# Patient Record
Sex: FEMALE | Race: WHITE | NOT HISPANIC OR LATINO | Employment: OTHER | URBAN - METROPOLITAN AREA
[De-identification: names, ages, dates, MRNs, and addresses within clinical notes are randomized per-mention and may not be internally consistent; named-entity substitution may affect disease eponyms.]

---

## 2018-05-02 ENCOUNTER — OFFICE VISIT (OUTPATIENT)
Dept: UROLOGY | Facility: CLINIC | Age: 58
End: 2018-05-02
Payer: COMMERCIAL

## 2018-05-02 VITALS
WEIGHT: 129 LBS | DIASTOLIC BLOOD PRESSURE: 82 MMHG | BODY MASS INDEX: 23.74 KG/M2 | SYSTOLIC BLOOD PRESSURE: 133 MMHG | HEIGHT: 62 IN

## 2018-05-02 DIAGNOSIS — N20.0 CALCULUS OF KIDNEY: Primary | ICD-10-CM

## 2018-05-02 DIAGNOSIS — N39.0 URINARY TRACT INFECTION WITHOUT HEMATURIA, SITE UNSPECIFIED: ICD-10-CM

## 2018-05-02 DIAGNOSIS — N20.1 CALCULUS OF DISTAL LEFT URETER: ICD-10-CM

## 2018-05-02 LAB
SL AMB  POCT GLUCOSE, UA: NORMAL
SL AMB LEUKOCYTE ESTERASE,UA: NORMAL
SL AMB POCT BLOOD,UA: NORMAL
SL AMB POCT CLARITY,UA: CLEAR
SL AMB POCT COLOR,UA: YELLOW
SL AMB POCT KETONES,UA: NORMAL
SL AMB POCT NITRITE,UA: NORMAL
SL AMB POCT PH,UA: 5
SL AMB POCT URINE PROTEIN: NORMAL

## 2018-05-02 PROCEDURE — 81002 URINALYSIS NONAUTO W/O SCOPE: CPT | Performed by: PHYSICIAN ASSISTANT

## 2018-05-02 PROCEDURE — 99213 OFFICE O/P EST LOW 20 MIN: CPT | Performed by: PHYSICIAN ASSISTANT

## 2018-05-02 RX ORDER — MULTIVIT-MIN/IRON FUM/FOLIC AC 7.5 MG-4
1 TABLET ORAL DAILY
COMMUNITY

## 2018-05-02 RX ORDER — LEVOTHYROXINE SODIUM 0.03 MG/1
25 TABLET ORAL DAILY
Refills: 1 | COMMUNITY
Start: 2018-04-12

## 2018-05-02 RX ORDER — METOPROLOL SUCCINATE 25 MG/1
50 TABLET, EXTENDED RELEASE ORAL
Refills: 0 | COMMUNITY
Start: 2018-02-05

## 2018-05-02 RX ORDER — FERROUS SULFATE 325(65) MG
TABLET ORAL
COMMUNITY
Start: 2010-09-16

## 2018-05-02 NOTE — PROGRESS NOTES
UROLOGY PROGRESS NOTE   Patient Identifiers: Darrin Saul (MRN [id number])  Date of Service: 5/2/2018    Subjective:    61year old female with history of kidney stones and she has a colostomy for Crohns disease  She was recently treated for a suspected infection by urgent care with 3 days of bactrim  He comes in complaining of left flank pain  Urine is clear  No fever or chills  Patient has  see above      Objective:     VITALS:    There were no vitals filed for this visit  LABS:  Lab Results   Component Value Date    HGB 9 0 (L) 02/11/2014    HCT 27 7 (L) 02/11/2014    WBC 14 54 (H) 02/11/2014     02/11/2014   ]    Lab Results   Component Value Date     02/11/2014    K 3 1 (L) 02/11/2014     02/11/2014    CO2 29 02/11/2014    BUN 10 02/11/2014    CREATININE 0 69 02/11/2014    CALCIUM 7 4 (L) 02/11/2014    GLUCOSE 99 02/11/2014   ]    INPATIENT MEDS:    Current Outpatient Prescriptions:     Cholecalciferol (VITAMIN D3) 93439 units TABS, Take by mouth, Disp: , Rfl:     ferrous sulfate 325 (65 Fe) mg tablet, Take by mouth, Disp: , Rfl:     levothyroxine 25 mcg tablet, Take 25 mcg by mouth daily, Disp: , Rfl: 1    metoprolol succinate (TOPROL-XL) 25 mg 24 hr tablet, Take 1/2 Tab  Daily, Disp: , Rfl: 0    Multiple Vitamins-Minerals (MULTIVITAMIN WITH MINERALS) tablet, Take 1 tablet by mouth daily, Disp: , Rfl:       Physical Exam:   There were no vitals taken for this visit  GEN: no acute distress    RESP: breathing comfortably with no accessory muscle use    ABD: slightly tender left lower quadrant, colostmy on the right   INCISION:    EXT: no significant peripheral edema       RADIOLOGY:   none     Assessment:   1  Left lower quadrant pain  2   Kidney stones     Plan:   - will get a CT stone study to evaluate for a ureteral stone or hydronephrosis  -  -  -

## 2018-05-04 ENCOUNTER — HOSPITAL ENCOUNTER (OUTPATIENT)
Dept: RADIOLOGY | Age: 58
Discharge: HOME | End: 2018-05-04
Payer: COMMERCIAL

## 2018-05-04 DIAGNOSIS — N20.1 CALCULUS OF DISTAL LEFT URETER: ICD-10-CM

## 2018-05-04 PROCEDURE — 74176 CT ABD & PELVIS W/O CONTRAST: CPT

## 2018-05-10 ENCOUNTER — TELEPHONE (OUTPATIENT)
Dept: UROLOGY | Facility: CLINIC | Age: 58
End: 2018-05-10

## 2018-05-11 DIAGNOSIS — N13.30 HYDRONEPHROSIS, UNSPECIFIED HYDRONEPHROSIS TYPE: Primary | ICD-10-CM

## 2018-05-16 NOTE — TELEPHONE ENCOUNTER
Spoke patient on the phone after reviewing the imaging studies    She is scheduled for a nuclear study but after review of the studies I do believe that this is a parapelvic cyst   I believe that a retrograde study will be the most distinct way to demonstrate whether it is a UPJ or a cyst patient is in agreement to do the study

## 2018-05-17 ENCOUNTER — CLINICAL SUPPORT (OUTPATIENT)
Dept: UROLOGY | Facility: CLINIC | Age: 58
End: 2018-05-17
Payer: COMMERCIAL

## 2018-05-17 ENCOUNTER — PREP FOR PROCEDURE (OUTPATIENT)
Dept: UROLOGY | Facility: CLINIC | Age: 58
End: 2018-05-17

## 2018-05-17 DIAGNOSIS — R93.89 ABNORMAL FINDING ON RADIOLOGY EXAM: Primary | ICD-10-CM

## 2018-05-17 DIAGNOSIS — R31.9 URINARY TRACT INFECTION WITH HEMATURIA, SITE UNSPECIFIED: Primary | ICD-10-CM

## 2018-05-17 DIAGNOSIS — N39.0 URINARY TRACT INFECTION WITH HEMATURIA, SITE UNSPECIFIED: Primary | ICD-10-CM

## 2018-05-17 DIAGNOSIS — Z01.818 PREOP EXAMINATION: ICD-10-CM

## 2018-05-17 LAB
SL AMB  POCT GLUCOSE, UA: ABNORMAL
SL AMB LEUKOCYTE ESTERASE,UA: ABNORMAL
SL AMB POCT BLOOD,UA: ABNORMAL
SL AMB POCT CLARITY,UA: ABNORMAL
SL AMB POCT COLOR,UA: YELLOW
SL AMB POCT KETONES,UA: ABNORMAL
SL AMB POCT NITRITE,UA: ABNORMAL
SL AMB POCT PH,UA: 5.5
SL AMB POCT URINE PROTEIN: ABNORMAL

## 2018-05-17 PROCEDURE — 87186 SC STD MICRODIL/AGAR DIL: CPT | Performed by: PHYSICIAN ASSISTANT

## 2018-05-17 PROCEDURE — 87086 URINE CULTURE/COLONY COUNT: CPT | Performed by: PHYSICIAN ASSISTANT

## 2018-05-17 PROCEDURE — 99211 OFF/OP EST MAY X REQ PHY/QHP: CPT

## 2018-05-17 PROCEDURE — 87077 CULTURE AEROBIC IDENTIFY: CPT | Performed by: PHYSICIAN ASSISTANT

## 2018-05-17 PROCEDURE — 81002 URINALYSIS NONAUTO W/O SCOPE: CPT

## 2018-05-17 PROCEDURE — P9612 CATHETERIZE FOR URINE SPEC: HCPCS

## 2018-05-17 RX ORDER — SULFAMETHOXAZOLE AND TRIMETHOPRIM 800; 160 MG/1; MG/1
1 TABLET ORAL EVERY 12 HOURS SCHEDULED
Qty: 14 TABLET | Refills: 0 | Status: SHIPPED | OUTPATIENT
Start: 2018-05-17 | End: 2018-05-24

## 2018-05-17 NOTE — PROGRESS NOTES
Chief Complaint     Urinary Tract Infection        Patient presents today c/o urinary frequency and pressure  She denies burning and hematuria  Catheter specimen was obtained and sent for culture  I spoke with Anna Melendez and he is sending in an antibiotic for patient to start  Awaiting culture results

## 2018-05-19 LAB — BACTERIA UR CULT: ABNORMAL

## 2018-05-22 NOTE — PRE-PROCEDURE INSTRUCTIONS
Pre-Surgery Instructions:   Medication Instructions    Cholecalciferol (VITAMIN D3) 40316 units TABS Instructed patient per Anesthesia Guidelines   ferrous sulfate 325 (65 Fe) mg tablet Instructed patient per Anesthesia Guidelines   levothyroxine 25 mcg tablet Instructed patient per Anesthesia Guidelines   metoprolol succinate (TOPROL-XL) 25 mg 24 hr tablet Instructed patient per Anesthesia Guidelines   Multiple Vitamins-Minerals (MULTIVITAMIN WITH MINERALS) tablet Instructed patient per Anesthesia Guidelines   sulfamethoxazole-trimethoprim (BACTRIM DS) 800-160 mg per tablet Instructed patient per Anesthesia Guidelines  Spoke to pt  Medication list reviewed & instructed  As of 5 22 18 pt to stop MV, vit D, iron  Instructed on tylenol only  Pt taking bactrim, last dose Wednesday pm    Am DOS pt to take levothyroxine, metoprolol  Showering instructions given at time of call  All instructions verbally understood by patient  No further questions  Beta blocker Med Class     Continue to take this heart medication on your normal schedule  If this is an oral medication and you take it in the morning, then you may take this medicine with a sip of water  Thyroxine Med Class     Continue to take this medication on your normal schedule  If this is an oral medication and you take it in the morning, then you may take this medicine with a sip of water

## 2018-05-23 ENCOUNTER — ANESTHESIA EVENT (OUTPATIENT)
Dept: PERIOP | Facility: HOSPITAL | Age: 58
End: 2018-05-23
Payer: COMMERCIAL

## 2018-05-23 NOTE — ANESTHESIA PREPROCEDURE EVALUATION
Review of Systems/Medical History  Patient summary reviewed    History of anesthetic complications PONV    Cardiovascular   Pulmonary       GI/Hepatic      Comment: Crohn's     Kidney stones,        Endo/Other     GYN       Hematology   Musculoskeletal       Neurology   Psychology           Physical Exam    Airway    Mallampati score: II  TM Distance: >3 FB  Neck ROM: full     Dental       Cardiovascular      Pulmonary      Other Findings        Anesthesia Plan  ASA Score- 2     Anesthesia Type- general with ASA Monitors  Additional Monitors:   Airway Plan: LMA  Plan Factors-    Induction- intravenous  Postoperative Plan-     Informed Consent- Anesthetic plan and risks discussed with patient

## 2018-05-24 ENCOUNTER — ANESTHESIA (OUTPATIENT)
Dept: PERIOP | Facility: HOSPITAL | Age: 58
End: 2018-05-24
Payer: COMMERCIAL

## 2018-05-24 ENCOUNTER — APPOINTMENT (OUTPATIENT)
Dept: RADIOLOGY | Facility: HOSPITAL | Age: 58
End: 2018-05-24
Payer: COMMERCIAL

## 2018-05-24 ENCOUNTER — HOSPITAL ENCOUNTER (OUTPATIENT)
Facility: HOSPITAL | Age: 58
Setting detail: OUTPATIENT SURGERY
Discharge: HOME | End: 2018-05-24
Attending: UROLOGY | Admitting: UROLOGY
Payer: COMMERCIAL

## 2018-05-24 VITALS
WEIGHT: 129 LBS | SYSTOLIC BLOOD PRESSURE: 135 MMHG | BODY MASS INDEX: 23.74 KG/M2 | DIASTOLIC BLOOD PRESSURE: 82 MMHG | RESPIRATION RATE: 18 BRPM | HEIGHT: 62 IN | HEART RATE: 79 BPM | OXYGEN SATURATION: 99 % | TEMPERATURE: 98.1 F

## 2018-05-24 DIAGNOSIS — R93.89 ABNORMAL FINDING ON RADIOLOGY EXAM: ICD-10-CM

## 2018-05-24 PROCEDURE — 52005 CYSTO W/URTRL CATHJ: CPT | Performed by: UROLOGY

## 2018-05-24 PROCEDURE — C1769 GUIDE WIRE: HCPCS | Performed by: UROLOGY

## 2018-05-24 PROCEDURE — 74420 UROGRAPHY RTRGR +-KUB: CPT

## 2018-05-24 PROCEDURE — 87086 URINE CULTURE/COLONY COUNT: CPT | Performed by: UROLOGY

## 2018-05-24 RX ORDER — PROPOFOL 10 MG/ML
INJECTION, EMULSION INTRAVENOUS CONTINUOUS PRN
Status: DISCONTINUED | OUTPATIENT
Start: 2018-05-24 | End: 2018-05-24

## 2018-05-24 RX ORDER — SODIUM CHLORIDE, SODIUM LACTATE, POTASSIUM CHLORIDE, CALCIUM CHLORIDE 600; 310; 30; 20 MG/100ML; MG/100ML; MG/100ML; MG/100ML
125 INJECTION, SOLUTION INTRAVENOUS CONTINUOUS
Status: DISCONTINUED | OUTPATIENT
Start: 2018-05-24 | End: 2018-05-24 | Stop reason: HOSPADM

## 2018-05-24 RX ORDER — ONDANSETRON 2 MG/ML
4 INJECTION INTRAMUSCULAR; INTRAVENOUS ONCE AS NEEDED
Status: DISCONTINUED | OUTPATIENT
Start: 2018-05-24 | End: 2018-05-24 | Stop reason: HOSPADM

## 2018-05-24 RX ORDER — DIPHENHYDRAMINE HYDROCHLORIDE 50 MG/ML
12.5 INJECTION INTRAMUSCULAR; INTRAVENOUS ONCE AS NEEDED
Status: DISCONTINUED | OUTPATIENT
Start: 2018-05-24 | End: 2018-05-24 | Stop reason: HOSPADM

## 2018-05-24 RX ORDER — LIDOCAINE HYDROCHLORIDE 10 MG/ML
INJECTION, SOLUTION INFILTRATION; PERINEURAL AS NEEDED
Status: DISCONTINUED | OUTPATIENT
Start: 2018-05-24 | End: 2018-05-24 | Stop reason: SURG

## 2018-05-24 RX ORDER — FENTANYL CITRATE/PF 50 MCG/ML
25 SYRINGE (ML) INJECTION
Status: DISCONTINUED | OUTPATIENT
Start: 2018-05-24 | End: 2018-05-24 | Stop reason: HOSPADM

## 2018-05-24 RX ORDER — KETOROLAC TROMETHAMINE 30 MG/ML
INJECTION, SOLUTION INTRAMUSCULAR; INTRAVENOUS AS NEEDED
Status: DISCONTINUED | OUTPATIENT
Start: 2018-05-24 | End: 2018-05-24 | Stop reason: SURG

## 2018-05-24 RX ORDER — MAGNESIUM HYDROXIDE 1200 MG/15ML
LIQUID ORAL AS NEEDED
Status: DISCONTINUED | OUTPATIENT
Start: 2018-05-24 | End: 2018-05-24 | Stop reason: HOSPADM

## 2018-05-24 RX ORDER — MIDAZOLAM HYDROCHLORIDE 1 MG/ML
INJECTION INTRAMUSCULAR; INTRAVENOUS AS NEEDED
Status: DISCONTINUED | OUTPATIENT
Start: 2018-05-24 | End: 2018-05-24 | Stop reason: SURG

## 2018-05-24 RX ORDER — PROPOFOL 10 MG/ML
INJECTION, EMULSION INTRAVENOUS AS NEEDED
Status: DISCONTINUED | OUTPATIENT
Start: 2018-05-24 | End: 2018-05-24 | Stop reason: SURG

## 2018-05-24 RX ADMIN — KETOROLAC TROMETHAMINE 30 MG: 30 INJECTION, SOLUTION INTRAMUSCULAR at 08:35

## 2018-05-24 RX ADMIN — PROPOFOL 80 MG: 10 INJECTION, EMULSION INTRAVENOUS at 08:06

## 2018-05-24 RX ADMIN — MIDAZOLAM 2 MG: 1 INJECTION INTRAMUSCULAR; INTRAVENOUS at 08:04

## 2018-05-24 RX ADMIN — SODIUM CHLORIDE, SODIUM LACTATE, POTASSIUM CHLORIDE, AND CALCIUM CHLORIDE: .6; .31; .03; .02 INJECTION, SOLUTION INTRAVENOUS at 07:35

## 2018-05-24 RX ADMIN — LIDOCAINE HYDROCHLORIDE 30 MG: 10 INJECTION, SOLUTION INFILTRATION; PERINEURAL at 08:06

## 2018-05-24 RX ADMIN — Medication 2000 MG: at 08:08

## 2018-05-24 RX ADMIN — PROPOFOL 80 MCG/KG/MIN: 10 INJECTION, EMULSION INTRAVENOUS at 08:06

## 2018-05-24 NOTE — H&P (VIEW-ONLY)
HISTORY AND PHYSICAL     Patient Identifiers: Kishore Bonilla (MRN: [id number])    Urology, Julio Michaels PA-C  Date of Service: 5/21/2018    History of Present Illness:     Kishore Bonilla is a 60 y o    female with history of kidney stones and she has a colostomy for Crohns disease  She was recently treated for a suspected infection by urgent care with 3 days of bactrim  She  comes in complaining of left flank pain  Urine is clear  No fever or chills  Follow up CT shows moderate left hydronephrosis consistant with congenital UPJ  Past Medical, Past Surgical History:     Past Medical History:   Diagnosis Date    Crohn's disease ([facility] [address] )     Gallstones     Kidney stones     Osteoporosis    :    Past Surgical History:   Procedure Laterality Date    COLECTOMY      LITHOTRIPSY      TOTAL HIP ARTHROPLASTY Left    :    Medications, Allergies:     Current Outpatient Prescriptions:     Cholecalciferol (VITAMIN D3) 61439 units TABS, Take by mouth, Disp: , Rfl:     ferrous sulfate 325 (65 Fe) mg tablet, Take by mouth, Disp: , Rfl:     levothyroxine 25 mcg tablet, Take 25 mcg by mouth daily, Disp: , Rfl: 1    metoprolol succinate (TOPROL-XL) 25 mg 24 hr tablet, Take 1/2 Tab   Daily, Disp: , Rfl: 0    Multiple Vitamins-Minerals (MULTIVITAMIN WITH MINERALS) tablet, Take 1 tablet by mouth daily, Disp: , Rfl:     sulfamethoxazole-trimethoprim (BACTRIM DS) 800-160 mg per tablet, Take 1 tablet by mouth every 12 (twelve) hours for 7 days, Disp: 14 tablet, Rfl: 0    Allergies:  No Known Allergies:    Social and Family History:   Social History:   Social History   Substance Use Topics    Smoking status: Former Smoker    Smokeless tobacco: Never Used    Alcohol use No        History   Smoking Status    Former Smoker   Smokeless Tobacco    Never Used       Family History:  Family History   Problem Relation Age of Onset    Diabetes Mother     Heart disease Father     Stroke Brother     Stomach cancer Maternal Grandmother    :     Review of Systems:     General: Fever, chills, or night sweats: negative  Cardiac: Negative for chest pain  Pulmonary: Negative for shortness of breath  Gastrointestinal: Abdominal pain negative  Nausea, vomiting, or diarrhea negative,  Genitourinary: See HPI above  Patient does not have hematuria  All other systems queried were negative  Physical Exam:   General: Patient is pleasant and in NAD  Awake and alert  There were no vitals taken for this visit  Cardiac: regular rate  Peripheral edema: negative  Pulmonary: Non-labored breathing lungs clear bilateral  Abdomen: Soft, non-tender, non-distended  No surgical scars  No masses, tenderness, hernias noted  Genitourinary: Negative CVA tenderness, negative suprapubic tenderness  Labs:     Lab Results   Component Value Date    HGB 9 0 (L) 02/11/2014    HCT 27 7 (L) 02/11/2014    WBC 14 54 (H) 02/11/2014     02/11/2014   ]    Lab Results   Component Value Date     02/11/2014    K 3 1 (L) 02/11/2014     02/11/2014    CO2 29 02/11/2014    BUN 10 02/11/2014    CREATININE 0 69 02/11/2014    CALCIUM 7 4 (L) 02/11/2014    GLUCOSE 99 02/11/2014   ]    Imaging:   I personally reviewed the images and report of the following studies, and reviewed them with the patient:  CT ABDOMEN AND PELVIS WITHOUT IV CONTRAST - LOW DOSE RENAL STONE   IMPRESSION:     There is moderate left hydronephrosis, with a particularly severely dilated left renal pelvis  This appearance was also present on the 6/28/2011 retrograde pyelogram, therefore is probably congenital UPJ obstruction  There are no obstructing left   ureteral stones  There is a punctate left kidney midpole calyceal stone (series 2 image 71 )  There is another 6 mm left lower pole calyceal stone (series 2 image 82 )      Cholelithiasis without CT evidence of acute       ASSESSMENT:   1   Left hydronephrosis    PLAN:   Cysto bilateral retrograde pyelogram possible  NU stent insertion       Monae Dent PA-C

## 2018-05-24 NOTE — ANESTHESIA POSTPROCEDURE EVALUATION
Post-Op Assessment Note      CV Status:  Stable    Mental Status:  Alert and awake    Hydration Status:  Euvolemic    PONV Controlled:  Controlled    Airway Patency:  Patent    Post Op Vitals Reviewed: Yes          Staff: CRNA           BP   123/70   Temp      Pulse  86   Resp   18   SpO2   100%

## 2018-05-24 NOTE — OP NOTE
OPERATIVE REPORT  PATIENT NAME: Joanna Carcamo    :  1960  MRN: [id number]  Pt Location: [facility] ROOM 01    SURGERY DATE: 2018    Surgeon(s) and Role:     * Aleyda Huff MD - Primary    Preop Diagnosis:  Abnormal finding on radiology exam [R93 8]  Left hydronephrosis     Post-Op Diagnosis Codes:     * Abnormal finding on radiology exam [R93 8]  Left hydronephrosis    Procedure(s) (LRB):  CYSTOSCOPY WITH RETROGRADE PYELOGRAM (Bilateral)    Specimen(s):  ID Type Source Tests Collected by Time Destination   A :  Urine Urine, Cystoscopic URINE CULTURE Aleyda Huff MD 2018 [id number]        Estimated Blood Loss:   Minimal    Drains:       Anesthesia Type:   General    Operative Indications:  Abnormal finding on radiology exam [R93 8]  Same    Operative Findings:  Significant deviation of the left proximal ureter with resultant dilatation of the left renal unit  Complications:   None    Procedure and Technique: This patient has had intermittent left flank pain for which a CT scan was ordered  This did reveal a hydronephrotic left kidney  She is brought to the operating room identified and transferred to the OR table  Sedation anesthesia was then administered  Patient was placed in lithotomy position genitalia prepped with Betadine  Cath specimens obtained for culture  The bladder was then inspected with 30 degree and 70 degree lenses  There was no significant pathology noted in the bladder  There was no tumor stone  The ureteral orifices were unremarkable  Bilateral retrograde studies were then performed  The right renal unit and ureter were completely unremarkable  The left ureter had significant deviation laterally  The retrograde study showed a mild hydronephrosis on that left side  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

## 2018-05-24 NOTE — DISCHARGE INSTRUCTIONS

## 2018-05-26 LAB — BACTERIA UR CULT: NORMAL

## 2018-05-29 DIAGNOSIS — N13.30 HYDRONEPHROSIS DETERMINED BY ULTRASOUND: Primary | ICD-10-CM

## 2018-05-29 NOTE — PROGRESS NOTES
Patient has a mild left hydronephrosis  There is prominent deviation of the left proximal ureter  This is likely secondary to her previous surgery    Will check renal function nuclear study

## 2018-06-01 ENCOUNTER — TELEPHONE (OUTPATIENT)
Dept: UROLOGY | Facility: CLINIC | Age: 58
End: 2018-06-01

## 2018-06-01 NOTE — TELEPHONE ENCOUNTER
I CALLED PT TO TRY AND SCHEDULE HER FOR A MAG 3 SCAN  THERE WAS NO ANSWER WHEN I CALLED SO I DID L/M ASKING PT TO CALL OUR OFFICE BACK TO GET THIS SCHEDULED

## 2018-06-04 ENCOUNTER — CLINICAL SUPPORT (OUTPATIENT)
Dept: UROLOGY | Facility: CLINIC | Age: 58
End: 2018-06-04
Payer: COMMERCIAL

## 2018-06-04 VITALS — HEIGHT: 62 IN | BODY MASS INDEX: 23.74 KG/M2 | WEIGHT: 129 LBS

## 2018-06-04 DIAGNOSIS — N39.0 URINARY TRACT INFECTION WITHOUT HEMATURIA, SITE UNSPECIFIED: Primary | ICD-10-CM

## 2018-06-04 LAB
SL AMB  POCT GLUCOSE, UA: ABNORMAL
SL AMB LEUKOCYTE ESTERASE,UA: ABNORMAL
SL AMB POCT BILIRUBIN,UA: ABNORMAL
SL AMB POCT BLOOD,UA: ABNORMAL
SL AMB POCT CLARITY,UA: CLEAR
SL AMB POCT COLOR,UA: YELLOW
SL AMB POCT KETONES,UA: ABNORMAL
SL AMB POCT NITRITE,UA: ABNORMAL
SL AMB POCT PH,UA: 5
SL AMB POCT SPECIFIC GRAVITY,UA: ABNORMAL
SL AMB POCT URINE PROTEIN: ABNORMAL
SL AMB POCT UROBILINOGEN: ABNORMAL

## 2018-06-04 PROCEDURE — 87086 URINE CULTURE/COLONY COUNT: CPT | Performed by: PHYSICIAN ASSISTANT

## 2018-06-04 PROCEDURE — 81002 URINALYSIS NONAUTO W/O SCOPE: CPT

## 2018-06-04 PROCEDURE — 87186 SC STD MICRODIL/AGAR DIL: CPT | Performed by: PHYSICIAN ASSISTANT

## 2018-06-04 PROCEDURE — 87077 CULTURE AEROBIC IDENTIFY: CPT | Performed by: PHYSICIAN ASSISTANT

## 2018-06-04 PROCEDURE — P9612 CATHETERIZE FOR URINE SPEC: HCPCS

## 2018-06-04 PROCEDURE — 99211 OFF/OP EST MAY X REQ PHY/QHP: CPT

## 2018-06-04 RX ORDER — NITROFURANTOIN MACROCRYSTALS 50 MG/1
CAPSULE ORAL
Qty: 30 CAPSULE | Refills: 3 | Status: SHIPPED | OUTPATIENT
Start: 2018-06-04 | End: 2021-05-06

## 2018-06-04 NOTE — PROGRESS NOTES
Patient complains of discomfort and burning with urination, specifically after having sexual relations  As per JOON Santamaria, we will send out a culture  He will give her an antibiotic to take after having sexual relations

## 2018-06-06 LAB — BACTERIA UR CULT: ABNORMAL

## 2018-08-07 ENCOUNTER — TELEPHONE (OUTPATIENT)
Dept: UROLOGY | Facility: CLINIC | Age: 58
End: 2018-08-07

## 2018-08-07 NOTE — TELEPHONE ENCOUNTER
I called pt to see if she was interested in r/s her appt from 07/13/18 that she had canceled  I did speak with pt and she said that at this time she is doing really well and is not having any problems  Pt said that she will call us if she needs us

## 2019-04-08 ENCOUNTER — TELEPHONE (OUTPATIENT)
Dept: UROLOGY | Facility: AMBULATORY SURGERY CENTER | Age: 59
End: 2019-04-08

## 2019-04-08 DIAGNOSIS — N20.0 KIDNEY STONES: Primary | ICD-10-CM

## 2019-04-12 ENCOUNTER — HOSPITAL ENCOUNTER (OUTPATIENT)
Dept: RADIOLOGY | Age: 59
Discharge: HOME | End: 2019-04-12
Payer: COMMERCIAL

## 2019-04-12 DIAGNOSIS — N20.0 KIDNEY STONES: ICD-10-CM

## 2019-04-12 PROCEDURE — 76770 US EXAM ABDO BACK WALL COMP: CPT

## 2019-04-16 ENCOUNTER — OFFICE VISIT (OUTPATIENT)
Dept: UROLOGY | Facility: CLINIC | Age: 59
End: 2019-04-16
Payer: COMMERCIAL

## 2019-04-16 VITALS
DIASTOLIC BLOOD PRESSURE: 84 MMHG | SYSTOLIC BLOOD PRESSURE: 122 MMHG | BODY MASS INDEX: 24.48 KG/M2 | HEIGHT: 62 IN | WEIGHT: 133 LBS

## 2019-04-16 DIAGNOSIS — N28.1 RENAL CYST: Primary | ICD-10-CM

## 2019-04-16 PROCEDURE — 99213 OFFICE O/P EST LOW 20 MIN: CPT | Performed by: PHYSICIAN ASSISTANT

## 2019-04-16 RX ORDER — CHOLECALCIFEROL (VITAMIN D3) 125 MCG
1000 CAPSULE ORAL
COMMUNITY

## 2019-04-16 RX ORDER — AMOXICILLIN 500 MG/1
CAPSULE ORAL
Refills: 0 | COMMUNITY
Start: 2019-02-11

## 2019-05-07 ENCOUNTER — TELEPHONE (OUTPATIENT)
Dept: UROLOGY | Facility: CLINIC | Age: 59
End: 2019-05-07

## 2019-06-03 PROBLEM — N28.1 RENAL CYST: Status: ACTIVE | Noted: 2019-06-03

## 2019-06-03 PROBLEM — R10.9 FLANK PAIN: Status: ACTIVE | Noted: 2019-06-03

## 2019-06-03 PROBLEM — N13.39 OTHER HYDRONEPHROSIS: Status: ACTIVE | Noted: 2019-06-03

## 2019-06-04 ENCOUNTER — OFFICE VISIT (OUTPATIENT)
Dept: UROLOGY | Facility: CLINIC | Age: 59
End: 2019-06-04
Payer: COMMERCIAL

## 2019-06-04 VITALS
HEIGHT: 62 IN | SYSTOLIC BLOOD PRESSURE: 120 MMHG | BODY MASS INDEX: 24.48 KG/M2 | DIASTOLIC BLOOD PRESSURE: 88 MMHG | HEART RATE: 74 BPM | WEIGHT: 133 LBS

## 2019-06-04 DIAGNOSIS — R93.89 ABNORMAL FINDING ON RADIOLOGY EXAM: Primary | ICD-10-CM

## 2019-06-04 DIAGNOSIS — N13.39 OTHER HYDRONEPHROSIS: ICD-10-CM

## 2019-06-04 DIAGNOSIS — R10.9 FLANK PAIN: ICD-10-CM

## 2019-06-04 DIAGNOSIS — N28.1 RENAL CYST: ICD-10-CM

## 2019-06-04 PROCEDURE — 99214 OFFICE O/P EST MOD 30 MIN: CPT | Performed by: UROLOGY

## 2019-06-20 RX ORDER — SODIUM CHLORIDE 9 MG/ML
75 INJECTION, SOLUTION INTRAVENOUS CONTINUOUS
Status: CANCELLED | OUTPATIENT
Start: 2019-06-27

## 2019-06-21 ENCOUNTER — TELEPHONE (OUTPATIENT)
Dept: RADIOLOGY | Facility: HOSPITAL | Age: 59
End: 2019-06-21

## 2019-06-27 ENCOUNTER — HOSPITAL ENCOUNTER (OUTPATIENT)
Dept: RADIOLOGY | Facility: HOSPITAL | Age: 59
Discharge: HOME | End: 2019-06-27
Attending: UROLOGY | Admitting: UROLOGY
Payer: COMMERCIAL

## 2019-06-27 VITALS
HEART RATE: 72 BPM | TEMPERATURE: 97.4 F | SYSTOLIC BLOOD PRESSURE: 130 MMHG | OXYGEN SATURATION: 96 % | BODY MASS INDEX: 23.92 KG/M2 | RESPIRATION RATE: 18 BRPM | WEIGHT: 130 LBS | DIASTOLIC BLOOD PRESSURE: 63 MMHG | HEIGHT: 62 IN

## 2019-06-27 DIAGNOSIS — N28.1 RENAL CYST: ICD-10-CM

## 2019-06-27 DIAGNOSIS — R10.9 FLANK PAIN: ICD-10-CM

## 2019-06-27 PROCEDURE — 99152 MOD SED SAME PHYS/QHP 5/>YRS: CPT | Performed by: RADIOLOGY

## 2019-06-27 PROCEDURE — 76942 ECHO GUIDE FOR BIOPSY: CPT | Performed by: RADIOLOGY

## 2019-06-27 PROCEDURE — 99152 MOD SED SAME PHYS/QHP 5/>YRS: CPT

## 2019-06-27 PROCEDURE — 50390 DRAINAGE OF KIDNEY LESION: CPT | Performed by: RADIOLOGY

## 2019-06-27 PROCEDURE — 10030 IMG GID FLU COLL DRG SFT TIS: CPT

## 2019-06-27 RX ORDER — SODIUM CHLORIDE 9 MG/ML
75 INJECTION, SOLUTION INTRAVENOUS CONTINUOUS
Status: DISCONTINUED | OUTPATIENT
Start: 2019-06-27 | End: 2019-06-28 | Stop reason: HOSPADM

## 2019-06-27 RX ORDER — ONDANSETRON 2 MG/ML
INJECTION INTRAMUSCULAR; INTRAVENOUS CODE/TRAUMA/SEDATION MEDICATION
Status: COMPLETED | OUTPATIENT
Start: 2019-06-27 | End: 2019-06-27

## 2019-06-27 RX ORDER — MIDAZOLAM HYDROCHLORIDE 1 MG/ML
INJECTION INTRAMUSCULAR; INTRAVENOUS CODE/TRAUMA/SEDATION MEDICATION
Status: COMPLETED | OUTPATIENT
Start: 2019-06-27 | End: 2019-06-27

## 2019-06-27 RX ORDER — FENTANYL CITRATE 50 UG/ML
INJECTION, SOLUTION INTRAMUSCULAR; INTRAVENOUS CODE/TRAUMA/SEDATION MEDICATION
Status: COMPLETED | OUTPATIENT
Start: 2019-06-27 | End: 2019-06-27

## 2019-06-27 RX ADMIN — FENTANYL CITRATE 50 MCG: 50 INJECTION INTRAMUSCULAR; INTRAVENOUS at 09:40

## 2019-06-27 RX ADMIN — ONDANSETRON 4 MG: 2 INJECTION INTRAMUSCULAR; INTRAVENOUS at 09:40

## 2019-06-27 RX ADMIN — MIDAZOLAM HYDROCHLORIDE 1 MG: 1 INJECTION, SOLUTION INTRAMUSCULAR; INTRAVENOUS at 09:40

## 2020-02-20 ENCOUNTER — APPOINTMENT (EMERGENCY)
Dept: RADIOLOGY | Facility: HOSPITAL | Age: 60
End: 2020-02-20
Payer: COMMERCIAL

## 2020-02-20 ENCOUNTER — HOSPITAL ENCOUNTER (EMERGENCY)
Facility: HOSPITAL | Age: 60
Discharge: HOME | End: 2020-02-20
Attending: EMERGENCY MEDICINE
Payer: COMMERCIAL

## 2020-02-20 ENCOUNTER — TELEPHONE (OUTPATIENT)
Dept: UROLOGY | Facility: MEDICAL CENTER | Age: 60
End: 2020-02-20

## 2020-02-20 VITALS
TEMPERATURE: 97.6 F | BODY MASS INDEX: 23.59 KG/M2 | HEART RATE: 70 BPM | SYSTOLIC BLOOD PRESSURE: 139 MMHG | DIASTOLIC BLOOD PRESSURE: 79 MMHG | WEIGHT: 129 LBS | OXYGEN SATURATION: 98 % | RESPIRATION RATE: 16 BRPM

## 2020-02-20 DIAGNOSIS — N12 PYELONEPHRITIS: Primary | ICD-10-CM

## 2020-02-20 LAB
BACTERIA UR QL AUTO: ABNORMAL /HPF
BILIRUB UR QL STRIP: NEGATIVE
CLARITY UR: CLEAR
COLOR UR: YELLOW
COLOR, POC: YELLOW
GLUCOSE UR STRIP-MCNC: NEGATIVE MG/DL
HGB UR QL STRIP.AUTO: ABNORMAL
HYALINE CASTS #/AREA URNS LPF: ABNORMAL /LPF
KETONES UR STRIP-MCNC: NEGATIVE MG/DL
LEUKOCYTE ESTERASE UR QL STRIP: ABNORMAL
NITRITE UR QL STRIP: NEGATIVE
NON-SQ EPI CELLS URNS QL MICRO: ABNORMAL /HPF
PH UR STRIP.AUTO: 5.5 [PH] (ref 4.5–8)
PROT UR STRIP-MCNC: ABNORMAL MG/DL
RBC #/AREA URNS AUTO: ABNORMAL /HPF
SP GR UR STRIP.AUTO: 1.01 (ref 1–1.03)
UROBILINOGEN UR QL STRIP.AUTO: 0.2 E.U./DL
WBC #/AREA URNS AUTO: ABNORMAL /HPF

## 2020-02-20 PROCEDURE — 99284 EMERGENCY DEPT VISIT MOD MDM: CPT

## 2020-02-20 PROCEDURE — 99284 EMERGENCY DEPT VISIT MOD MDM: CPT | Performed by: PHYSICIAN ASSISTANT

## 2020-02-20 PROCEDURE — 74176 CT ABD & PELVIS W/O CONTRAST: CPT

## 2020-02-20 PROCEDURE — 81001 URINALYSIS AUTO W/SCOPE: CPT

## 2020-02-20 RX ORDER — SULFAMETHOXAZOLE AND TRIMETHOPRIM 800; 160 MG/1; MG/1
1 TABLET ORAL 2 TIMES DAILY
Qty: 28 TABLET | Refills: 0 | Status: SHIPPED | OUTPATIENT
Start: 2020-02-20 | End: 2020-03-05

## 2020-02-20 NOTE — TELEPHONE ENCOUNTER
Patient of Dr Jose Angel Lu seen in the [facility] office  Patient called to report back pain, kidney pain  Back Line called   Suggestion was for patient to go to ER  Patient reported understanding   Please be aware    Thank you

## 2020-02-20 NOTE — ED PROVIDER NOTES
History  Chief Complaint   Patient presents with    Back Pain     hISTORY OF KIDNEY STONES AND HAVING SIMILAR BACK PAIN TODAY  hAVING THE PAIN OFF AN ON FOR A WEEK     Patient is a 23-year-old female with history of Crohn's disease (currently with ileostomy), HTN, kidney stones (requiring lithotripsy), gallstones, osteoporosis, presents to the ED for evaluation of right flank pain since this morning  Pt states she woke up this morning with right flank pain with radiation pain to  Patient describes pain as waxing and waning sensation  Patient also with urinary frequency and nausea this morning  Patient has not taken anything for symptoms  Patient denies fever, chills, chest pain, shortness of breath, vomiting, diarrhea, hematuria, vaginal bleeding, vaginal discharge  Prior to Admission Medications   Prescriptions Last Dose Informant Patient Reported? Taking? Cholecalciferol (VITAMIN D3) 80697 units TABS  Self Yes No   Sig: Take by mouth   Multiple Vitamins-Minerals (MULTIVITAMIN WITH MINERALS) tablet  Self Yes No   Sig: Take 1 tablet by mouth daily   Probiotic Product (SUPER PROBIOTIC PO)   Yes No   Sig: Take 1 capsule by mouth daily   amoxicillin (AMOXIL) 500 mg capsule  Self Yes No   Sig: TAKE 4 CAPSULES 1 HOUR PRIOR TO DENTAL APPOINTMENT    cyanocobalamin (VITAMIN B-12) 500 mcg tablet  Self Yes No   Sig: Take 1,000 mcg by mouth   ferrous sulfate 325 (65 Fe) mg tablet  Self Yes No   Sig: Take by mouth   levothyroxine 25 mcg tablet  Self Yes No   Sig: Take 25 mcg by mouth daily   metoprolol succinate (TOPROL-XL) 25 mg 24 hr tablet  Self Yes No   Sig: Take 1/2 Tab   Daily   nitrofurantoin (MACRODANTIN) 50 mg capsule  Self No No   Sig: One tablet after intercourse      Facility-Administered Medications: None       Past Medical History:   Diagnosis Date    Crohn's disease ([facility] [address] )     Disease of thyroid gland     Gallstones     History of transfusion     Hypertension     Kidney stones     Osteoporosis     PONV (postoperative nausea and vomiting)        Past Surgical History:   Procedure Laterality Date    COLECTOMY      ILEOSTOMY      IR IMAGE GUIDED ASPIRATION / DRAINAGE W TUBE  6/27/2019    LITHOTRIPSY      AR CYSTOURETHROSCOPY,URETER CATHETER Bilateral 5/24/2018    Procedure: CYSTOSCOPY WITH RETROGRADE PYELOGRAM;  Surgeon: Len Meier MD;  [facility]: [facility];  Service: Urology    TOTAL HIP ARTHROPLASTY Left        Family History   Problem Relation Age of Onset    Diabetes Mother     Heart disease Father     Stroke Brother     Stomach cancer Maternal Grandmother      I have reviewed and agree with the history as documented  Social History     Tobacco Use    Smoking status: Former Smoker    Smokeless tobacco: Never Used    Tobacco comment: quit  20 years ago    Substance Use Topics    Alcohol use: No    Drug use: No       Review of Systems   Constitutional: Negative for chills and fever  HENT: Negative for ear pain and sore throat  Eyes: Negative for redness  Respiratory: Negative for chest tightness and shortness of breath  Cardiovascular: Negative for chest pain, palpitations and leg swelling  Gastrointestinal: Positive for nausea  Negative for abdominal pain, diarrhea and vomiting  Genitourinary: Positive for flank pain and frequency  Negative for dysuria, hematuria, vaginal bleeding and vaginal discharge  Musculoskeletal: Positive for back pain  Negative for neck pain  Skin: Negative for rash  Neurological: Negative for weakness and headaches  Psychiatric/Behavioral: Negative for confusion  Physical Exam  Physical Exam   Constitutional: She is oriented to person, place, and time  She appears well-developed and well-nourished  Non-toxic appearance  She does not have a sickly appearance  She does not appear ill  HENT:   Head: Normocephalic and atraumatic     Nose: Nose normal    Mouth/Throat: Mucous membranes are normal    Neck: Normal range of motion  Cardiovascular: Normal rate and regular rhythm  Pulmonary/Chest: Effort normal and breath sounds normal  No stridor  She has no decreased breath sounds  She has no wheezes  She has no rhonchi  She has no rales  Abdominal: Soft  Bowel sounds are normal  There is no tenderness  There is CVA tenderness  There is no rigidity, no rebound and no guarding  ileostomy bag   Musculoskeletal: Normal range of motion  Neurological: She is alert and oriented to person, place, and time  Skin: Skin is warm and dry  Capillary refill takes less than 2 seconds  Psychiatric: She has a normal mood and affect   Her behavior is normal        Vital Signs  ED Triage Vitals [02/20/20 1205]   Temperature Pulse Respirations Blood Pressure SpO2   97 6 °F (36 4 °C) 78 16 165/94 99 %      Temp src Heart Rate Source Patient Position - Orthostatic VS BP Location FiO2 (%)   -- Monitor -- -- --      Pain Score       9           Vitals:    02/20/20 1205 02/20/20 1604   BP: 165/94 139/79   Pulse: 78 70         Visual Acuity      ED Medications  Medications - No data to display    Diagnostic Studies  Results Reviewed     Procedure Component Value Units Date/Time    Urine Microscopic [[id number]]  (Abnormal) Collected:  02/20/20 1418    Lab Status:  Final result Specimen:  Urine, Other Updated:  02/20/20 1453     RBC, UA 10-20 /hpf      WBC, UA 4-10 /hpf      Epithelial Cells None Seen /hpf      Bacteria, UA None Seen /hpf      Hyaline Casts, UA 5-10 /lpf     POCT urinalysis dipstick [[id number]]  (Normal) Resulted:  02/20/20 1418    Lab Status:  Final result Specimen:  Urine Updated:  02/20/20 1418     Color, UA yellow    Urine Macroscopic, POC [[id number]]  (Abnormal) Collected:  02/20/20 1418    Lab Status:  Final result Specimen:  Urine Updated:  02/20/20 1414     Color, UA Yellow     Clarity, UA Clear     pH, UA 5 5     Leukocytes, UA Small     Nitrite, UA Negative     Protein, UA Trace mg/dl      Glucose, UA Negative mg/dl Ketones, UA Negative mg/dl      Urobilinogen, UA 0 2 E U /dl      Bilirubin, UA Negative     Blood, UA Large     Specific [place], UA 1 015    Narrative:       CLINITEK RESULT                 CT renal stone study abdomen pelvis without contrast   Final Result by Lillie Durant MD (02/20 1612)      1  Nonobstructing left renal calculi measuring up to 6 mm, unchanged  2  Malrotated and inferiorly positioned left kidney, unchanged  There is a large exophytic left renal sinus cyst versus chronically dilated renal pelvis with wall calcification, unchanged from prior exam   No hydroureter  3   Cholelithiasis  4   Total colectomy with right lower quadrant end ileostomy  Workstation performed: [id number]                    Procedures  Procedures         ED Course  ED Course as of Feb 20 1633   Thu Feb 20, 2020   1300 Patient declining pain medication at this  1624 1  Nonobstructing left renal calculi measuring up to 6 mm, unchanged       2  Malrotated and inferiorly positioned left kidney, unchanged  There is a large exophytic left renal sinus cyst versus chronically dilated renal pelvis with wall calcification, unchanged from prior exam   No hydroureter      3  Cholelithiasis      4  Total colectomy with right lower quadrant end ileostomy  CT renal stone study abdomen pelvis without contrast                               MDM  Number of Diagnoses or Management Options  Pyelonephritis: new and does not require workup  Diagnosis management comments: Patient is a 58-year-old female with history of Crohn's disease (currently with ileostomy), HTN, kidney stones (requiring lithotripsy), gallstones, osteoporosis, presents to the ED for evaluation of right flank pain since this morning  Pt states she woke up this morning with right flank pain with radiation pain to  Patient describes pain as waxing and waning sensation  Patient also with urinary frequency and nausea this morning  Patient has not taken anything for symptoms  On arrival to emergency department, patient well-appearing, nontoxic, afebrile with right CVA tenderness  Patient declined pain medication  UA shows large blood signs of infection  Discussed with patient the need for CT scan to evaluate for infected stone versus pyelonephritis versus other etiology  Patient verbalizes understanding and agrees with plan to move forward with CT scan  CT scan showed:  "1  Nonobstructing left renal calculi measuring up to 6 mm, unchanged  2  Malrotated and inferiorly positioned left kidney, unchanged  There is a large exophytic left renal sinus cyst versus chronically dilated renal pelvis with wall calcification, unchanged from prior exam   No hydroureter  3   Cholelithiasis  4   Total colectomy with right lower quadrant end ileostomy "    Discussed these results with patient at bedside  Suspect pyelonephritis given CVA tenderness an infected urine  Patient states she has had Bactrim in the past which has been susceptible to her urine cultures  Rx her Bactrim for red patient  Patient follows with Dr Lilliana Chavarria with Urology  Instructed patient to make appointment with Dr Jessica Alan next week for re-evaluation  Pt verbalizes understanding and agrees with plan  The management plan was discussed in detail with the patient at bedside and all questions were answered  Prior to discharge, I provided both verbal and written instructions  I discussed with the patient the signs and symptoms for which to return to the emergency department  All questions were answered and patient was comfortable with the plan of care and discharged to home  The patient verbalized understanding of our discussion and plan of care, and agrees to return to the Emergency Department for concerns and progression of illness            Disposition  Final diagnoses:   Pyelonephritis     Time reflects when diagnosis was documented in both MDM as applicable and the Disposition within this note     Time User Action Codes Description Comment    2/20/2020  4:20 PM Domo Rasp Add [N12] Pyelonephritis       ED Disposition     ED Disposition Condition Date/Time Comment    Discharge Stable Thu Feb 20, 2020  4:20 PM James Bryan discharge to home/self care  Follow-up Information     Follow up With Specialties Details Why Candy Oconnell MD Urology Schedule an appointment as soon as possible for a visit   [facility] [place] [facility] [address]  [address] [place] [address]  [phone]            Discharge Medication List as of 2/20/2020  4:22 PM      START taking these medications    Details   sulfamethoxazole-trimethoprim (BACTRIM DS) 800-160 mg per tablet Take 1 tablet by mouth 2 (two) times a day for 14 days smx-tmp DS (BACTRIM) 800-160 mg tabs (1tab q12 D10), Starting Thu 2/20/2020, Until Thu 3/5/2020, Print         CONTINUE these medications which have NOT CHANGED    Details   amoxicillin (AMOXIL) 500 mg capsule TAKE 4 CAPSULES 1 HOUR PRIOR TO DENTAL APPOINTMENT , Historical Med      Cholecalciferol (VITAMIN D3) 63343 units TABS Take by mouth, Historical Med      cyanocobalamin (VITAMIN B-12) 500 mcg tablet Take 1,000 mcg by mouth, Historical Med      ferrous sulfate 325 (65 Fe) mg tablet Take by mouth, Starting Thu 9/16/2010, Historical Med      levothyroxine 25 mcg tablet Take 25 mcg by mouth daily, Starting Thu 4/12/2018, Historical Med      metoprolol succinate (TOPROL-XL) 25 mg 24 hr tablet Take 1/2 Tab  Daily, Starting Mon 2/5/2018, Historical Med      Multiple Vitamins-Minerals (MULTIVITAMIN WITH MINERALS) tablet Take 1 tablet by mouth daily, Historical Med      nitrofurantoin (MACRODANTIN) 50 mg capsule One tablet after intercourse, Normal      Probiotic Product (SUPER PROBIOTIC PO) Take 1 capsule by mouth daily, Historical Med           No discharge procedures on file      PDMP Review     None          ED Provider  Electronically Signed by           Daisy Conway PA-C  02/20/20 9477

## 2020-04-11 ENCOUNTER — TELEPHONE (OUTPATIENT)
Dept: OTHER | Facility: OTHER | Age: 60
End: 2020-04-11

## 2020-04-11 DIAGNOSIS — N39.0 URINARY TRACT INFECTION WITHOUT HEMATURIA, SITE UNSPECIFIED: Primary | ICD-10-CM

## 2020-04-11 RX ORDER — CEPHALEXIN 500 MG/1
500 CAPSULE ORAL EVERY 12 HOURS SCHEDULED
Qty: 14 CAPSULE | Refills: 0 | Status: SHIPPED | OUTPATIENT
Start: 2020-04-11 | End: 2020-04-18

## 2020-06-08 ENCOUNTER — TELEPHONE (OUTPATIENT)
Dept: NEPHROLOGY | Facility: CLINIC | Age: 60
End: 2020-06-08

## 2020-07-20 ENCOUNTER — TELEPHONE (OUTPATIENT)
Dept: UROLOGY | Facility: MEDICAL CENTER | Age: 60
End: 2020-07-20

## 2020-07-20 DIAGNOSIS — N28.1 RENAL CYST: Primary | ICD-10-CM

## 2020-07-20 NOTE — TELEPHONE ENCOUNTER
Called and spoke with patient at this time  She reports she has had this renal cyst for 'quite awhile' and as she keeps needing it drained she would like to discuss more permanent options  Advised we can get repeat US and schedule her for follow up with Dr Alfred Pedraza  Patient states she may get US done at [address]  Advised if she goes somewhere other than [facility] we will need her to bring a disc with her  Patient verbalized understanding and took number for [facility] central scheduling  Appointment scheduled for 8/12/20 at 330pm  Patient asked if we could mail [address] script and appointment card  Requested info mailed out today

## 2020-07-20 NOTE — TELEPHONE ENCOUNTER
Patient of Dr Jalaine Hodgkins in the [facility] office  Patient called in regard to symptoms of the renal cyst   She is requesting to have ultrasound prior to making an appointment    Please call at [phone]  Thank you

## 2020-07-20 NOTE — TELEPHONE ENCOUNTER
Patient previously of the [facility], now a patient of Dr Rita Vogt  She is known to the practice for history of kidney stones and left renal cyst  Patients last office visit was 6/4/2019 and patient had IR guided cyst drainage on 6/27/2019  She cancelled her scheduled follow up after  Of note patient also has Crohn's disease with ileostomy  Patient also to ER 2/20/20 for flank pain, diagnosed with pyelonephritis, CT Scan showed:  CT renal stone study abdomen pelvis without contrast   Final Result by Jeaneth Levin MD (02/20 6642)       1  Nonobstructing left renal calculi measuring up to 6 mm, unchanged         2  Malrotated and inferiorly positioned left kidney, unchanged  There is a large exophytic left renal sinus cyst versus chronically dilated renal pelvis with wall calcification, unchanged from prior exam   No hydroureter        3  Cholelithiasis        4  Total colectomy with right lower quadrant end ileostomy  Patient now calling with symptoms of recurring cyst, wanting US prior to appointment       Please advise on appropriate imaging prior to appointment and if patient can be seen by AP or MD

## 2020-07-29 ENCOUNTER — HOSPITAL ENCOUNTER (OUTPATIENT)
Dept: RADIOLOGY | Age: 60
Discharge: HOME | End: 2020-07-29
Payer: COMMERCIAL

## 2020-07-29 DIAGNOSIS — N28.1 RENAL CYST: ICD-10-CM

## 2020-07-29 PROCEDURE — 76770 US EXAM ABDO BACK WALL COMP: CPT

## 2020-08-25 NOTE — TELEPHONE ENCOUNTER
Unfortunately Dr Gabriel Bustillos is widely unavailable in the office  Can offer next available with Dr Gabriel Bustillos per her preference

## 2020-08-25 NOTE — TELEPHONE ENCOUNTER
Pt received message that her appointment has been rescheduled 09/03/20 10am she is unable to do that time she's requesting 11:30-12 or late afternoon

## 2020-08-26 NOTE — TELEPHONE ENCOUNTER
Pt unhappy about access for MD cavazos  She requested to be scheduled with Teresa Nelson because she use to be seen by Dr Sai Saldana  Pt was unaware that the [facility] office was closed  Pt unhappy with this information

## 2020-11-03 ENCOUNTER — OFFICE VISIT (OUTPATIENT)
Dept: UROLOGY | Facility: CLINIC | Age: 60
End: 2020-11-03
Payer: COMMERCIAL

## 2020-11-03 VITALS
HEIGHT: 62 IN | BODY MASS INDEX: 24.11 KG/M2 | TEMPERATURE: 98.1 F | HEART RATE: 71 BPM | DIASTOLIC BLOOD PRESSURE: 82 MMHG | SYSTOLIC BLOOD PRESSURE: 136 MMHG | WEIGHT: 131 LBS

## 2020-11-03 DIAGNOSIS — N28.1 ACQUIRED RENAL CYST OF LEFT KIDNEY: Primary | ICD-10-CM

## 2020-11-03 PROCEDURE — 99213 OFFICE O/P EST LOW 20 MIN: CPT | Performed by: PHYSICIAN ASSISTANT

## 2020-11-04 ENCOUNTER — PREP FOR PROCEDURE (OUTPATIENT)
Dept: INTERVENTIONAL RADIOLOGY/VASCULAR | Facility: CLINIC | Age: 60
End: 2020-11-04

## 2020-11-04 DIAGNOSIS — N28.1 RENAL CYST: Primary | ICD-10-CM

## 2020-11-24 ENCOUNTER — TELEMEDICINE (OUTPATIENT)
Dept: INTERVENTIONAL RADIOLOGY/VASCULAR | Facility: CLINIC | Age: 60
End: 2020-11-24
Payer: COMMERCIAL

## 2020-11-24 DIAGNOSIS — N28.1 RENAL CYST: Primary | ICD-10-CM

## 2020-11-24 DIAGNOSIS — R10.9 FLANK PAIN: ICD-10-CM

## 2020-11-24 PROCEDURE — 99214 OFFICE O/P EST MOD 30 MIN: CPT | Performed by: RADIOLOGY

## 2021-01-26 ENCOUNTER — HOSPITAL ENCOUNTER (OUTPATIENT)
Dept: CT IMAGING | Facility: HOSPITAL | Age: 61
Discharge: HOME | End: 2021-01-26
Attending: RADIOLOGY
Payer: COMMERCIAL

## 2021-01-26 VITALS
HEIGHT: 62 IN | OXYGEN SATURATION: 98 % | BODY MASS INDEX: 23.19 KG/M2 | DIASTOLIC BLOOD PRESSURE: 59 MMHG | TEMPERATURE: 97.7 F | SYSTOLIC BLOOD PRESSURE: 129 MMHG | WEIGHT: 126 LBS | RESPIRATION RATE: 18 BRPM | HEART RATE: 74 BPM

## 2021-01-26 DIAGNOSIS — N28.1 RENAL CYST: ICD-10-CM

## 2021-01-26 LAB
BASOPHILS # BLD AUTO: 0.03 THOUSANDS/ΜL (ref 0–0.1)
BASOPHILS NFR BLD AUTO: 0 % (ref 0–1)
EOSINOPHIL # BLD AUTO: 0.08 THOUSAND/ΜL (ref 0–0.61)
EOSINOPHIL NFR BLD AUTO: 1 % (ref 0–6)
ERYTHROCYTE [DISTWIDTH] IN BLOOD BY AUTOMATED COUNT: 14.3 % (ref 11.6–15.1)
HCT VFR BLD AUTO: 42.2 % (ref 34.8–46.1)
HGB BLD-MCNC: 13.4 G/DL (ref 11.5–15.4)
IMM GRANULOCYTES # BLD AUTO: 0.01 THOUSAND/UL (ref 0–0.2)
IMM GRANULOCYTES NFR BLD AUTO: 0 % (ref 0–2)
INR PPP: 0.89 (ref 0.84–1.19)
LYMPHOCYTES # BLD AUTO: 1.2 THOUSANDS/ΜL (ref 0.6–4.47)
LYMPHOCYTES NFR BLD AUTO: 15 % (ref 14–44)
MCH RBC QN AUTO: 29.1 PG (ref 26.8–34.3)
MCHC RBC AUTO-ENTMCNC: 31.8 G/DL (ref 31.4–37.4)
MCV RBC AUTO: 92 FL (ref 82–98)
MONOCYTES # BLD AUTO: 0.58 THOUSAND/ΜL (ref 0.17–1.22)
MONOCYTES NFR BLD AUTO: 7 % (ref 4–12)
NEUTROPHILS # BLD AUTO: 6.08 THOUSANDS/ΜL (ref 1.85–7.62)
NEUTS SEG NFR BLD AUTO: 77 % (ref 43–75)
NRBC BLD AUTO-RTO: 0 /100 WBCS
PLATELET # BLD AUTO: 272 THOUSANDS/UL (ref 149–390)
PMV BLD AUTO: 9.6 FL (ref 8.9–12.7)
PROTHROMBIN TIME: 12.1 SECONDS (ref 11.6–14.5)
RBC # BLD AUTO: 4.61 MILLION/UL (ref 3.81–5.12)
WBC # BLD AUTO: 7.98 THOUSAND/UL (ref 4.31–10.16)

## 2021-01-26 PROCEDURE — 99152 MOD SED SAME PHYS/QHP 5/>YRS: CPT

## 2021-01-26 PROCEDURE — C1729 CATH, DRAINAGE: HCPCS

## 2021-01-26 PROCEDURE — 99153 MOD SED SAME PHYS/QHP EA: CPT

## 2021-01-26 PROCEDURE — 85025 COMPLETE CBC W/AUTO DIFF WBC: CPT | Performed by: RADIOLOGY

## 2021-01-26 PROCEDURE — 49405 IMAGE CATH FLUID COLXN VISC: CPT | Performed by: RADIOLOGY

## 2021-01-26 PROCEDURE — 10030 IMG GID FLU COLL DRG SFT TIS: CPT

## 2021-01-26 PROCEDURE — 85610 PROTHROMBIN TIME: CPT | Performed by: RADIOLOGY

## 2021-01-26 PROCEDURE — C1769 GUIDE WIRE: HCPCS

## 2021-01-26 RX ORDER — FENTANYL CITRATE 50 UG/ML
INJECTION, SOLUTION INTRAMUSCULAR; INTRAVENOUS CODE/TRAUMA/SEDATION MEDICATION
Status: COMPLETED | OUTPATIENT
Start: 2021-01-26 | End: 2021-01-26

## 2021-01-26 RX ORDER — MIDAZOLAM HYDROCHLORIDE 2 MG/2ML
INJECTION, SOLUTION INTRAMUSCULAR; INTRAVENOUS CODE/TRAUMA/SEDATION MEDICATION
Status: COMPLETED | OUTPATIENT
Start: 2021-01-26 | End: 2021-01-26

## 2021-01-26 RX ORDER — SODIUM CHLORIDE 9 MG/ML
10 INJECTION INTRAVENOUS DAILY
Qty: 30 SYRINGE | Refills: 2 | Status: SHIPPED | OUTPATIENT
Start: 2021-01-26 | End: 2021-05-06

## 2021-01-26 RX ORDER — ONDANSETRON 2 MG/ML
INJECTION INTRAMUSCULAR; INTRAVENOUS CODE/TRAUMA/SEDATION MEDICATION
Status: COMPLETED | OUTPATIENT
Start: 2021-01-26 | End: 2021-01-26

## 2021-01-26 RX ADMIN — ONDANSETRON HYDROCHLORIDE 4 MG: 2 INJECTION, SOLUTION INTRAVENOUS at 14:14

## 2021-01-26 RX ADMIN — FENTANYL CITRATE 50 MCG: 50 INJECTION, SOLUTION INTRAMUSCULAR; INTRAVENOUS at 14:34

## 2021-01-26 RX ADMIN — MIDAZOLAM HYDROCHLORIDE 1 MG: 1 INJECTION, SOLUTION INTRAMUSCULAR; INTRAVENOUS at 14:24

## 2021-01-26 RX ADMIN — MIDAZOLAM HYDROCHLORIDE 1 MG: 1 INJECTION, SOLUTION INTRAMUSCULAR; INTRAVENOUS at 14:17

## 2021-01-26 RX ADMIN — FENTANYL CITRATE 50 MCG: 50 INJECTION, SOLUTION INTRAMUSCULAR; INTRAVENOUS at 14:17

## 2021-01-26 NOTE — DISCHARGE INSTRUCTIONS
TUBE CARE INSTRUCTIONS    Care after your procedure:    Resume your normal diet  Small sips of flat soda will help with nausea  1  The properly functioning catheter should be forward flushed once (1x) daily with 10ml of normal saline using clean technique  You will be given a prescription for flushes  To flush the tube, clean both connections with alcohol swab  Twist off the drainage bag/ bulb  tubing and twist the saline syringe into the drainage tube and flush  Remove the syringe and twist the drainage bag / bulb tubing tubing back on     2  The drainage bag/bulb may be emptied as necessary  Keep a record of the amount of fluid you drain from your tube  This should be done with clean technique as well  3  A fresh dressing should be applied daily over the tube insertion site  4  As the tube is secured to the skin with only a suture,try not to pull on your tube  Tub baths are not permitted  Showers are permitted if the patient's skin entry site is prevented from getting wet  Similarly, washcloth "baths" are acceptable  Contact Interventional Radiology at [phone] [place] PATIENTS: Contact Interventional Radiology at [phone]) Juliano Figueroa PATIENTS: Contact Interventional Radiology at [phone]) if:    1  Leakage or large amounts of liquid around the catheter  2  Fever of 101 degrees lasting several hours without other obvious cause (such as sore throat, flu, etc)  3  Persistent nausea or vomiting  4  Diminished drainage, which may be associated with pressure or pain  Or when the     drainage from your tube is less than 10mls for 48 hours  5  Catheter pulled back or falls out  The following pharmacies carry the flush syringes         [facility]                     [facility]  [address]                         [address] [facility]  Phone [phone]            Phone [phone]   [place] [address]                                [phone]  [address] [facility] [address]                      [facility]  Phone [phone]            Phone [phone]                      Bertin Masters                                                                                                          [phone]  [facility]  [facility] [address]    [address]  Phone [phone]

## 2021-01-29 ENCOUNTER — HOSPITAL ENCOUNTER (OUTPATIENT)
Dept: RADIOLOGY | Facility: HOSPITAL | Age: 61
Discharge: HOME | End: 2021-01-29
Attending: RADIOLOGY | Admitting: RADIOLOGY
Payer: COMMERCIAL

## 2021-01-29 VITALS
DIASTOLIC BLOOD PRESSURE: 83 MMHG | RESPIRATION RATE: 16 BRPM | HEART RATE: 81 BPM | SYSTOLIC BLOOD PRESSURE: 135 MMHG | TEMPERATURE: 98.6 F | OXYGEN SATURATION: 99 %

## 2021-01-29 DIAGNOSIS — N28.1 RENAL CYST: Primary | ICD-10-CM

## 2021-01-29 PROCEDURE — 49423 EXCHANGE DRAINAGE CATHETER: CPT

## 2021-01-29 PROCEDURE — C1769 GUIDE WIRE: HCPCS

## 2021-01-29 PROCEDURE — 49424 ASSESS CYST CONTRAST INJECT: CPT | Performed by: RADIOLOGY

## 2021-01-29 PROCEDURE — 75984 XRAY CONTROL CATHETER CHANGE: CPT

## 2021-01-29 PROCEDURE — 76080 X-RAY EXAM OF FISTULA: CPT | Performed by: RADIOLOGY

## 2021-01-29 RX ORDER — LIDOCAINE WITH 8.4% SOD BICARB 0.9%(10ML)
SYRINGE (ML) INJECTION CODE/TRAUMA/SEDATION MEDICATION
Status: COMPLETED | OUTPATIENT
Start: 2021-01-29 | End: 2021-01-29

## 2021-01-29 RX ORDER — METRONIDAZOLE 375 MG/1
375 CAPSULE ORAL 2 TIMES DAILY
Qty: 10 CAPSULE | Refills: 0 | Status: SHIPPED | OUTPATIENT
Start: 2021-01-29 | End: 2021-02-03

## 2021-01-29 RX ORDER — CIPROFLOXACIN 500 MG/5ML
250 KIT ORAL 2 TIMES DAILY
Qty: 25 ML | Refills: 0 | Status: SHIPPED | OUTPATIENT
Start: 2021-01-29 | End: 2021-02-03

## 2021-01-29 RX ORDER — FENTANYL CITRATE 50 UG/ML
INJECTION, SOLUTION INTRAMUSCULAR; INTRAVENOUS CODE/TRAUMA/SEDATION MEDICATION
Status: COMPLETED | OUTPATIENT
Start: 2021-01-29 | End: 2021-01-29

## 2021-01-29 RX ORDER — CIPROFLOXACIN 500 MG/1
500 TABLET, FILM COATED ORAL EVERY 12 HOURS SCHEDULED
Qty: 10 TABLET | Refills: 0 | Status: SHIPPED | OUTPATIENT
Start: 2021-01-29 | End: 2021-02-03

## 2021-01-29 RX ORDER — CEFAZOLIN SODIUM 1 G/3ML
INJECTION, POWDER, FOR SOLUTION INTRAMUSCULAR; INTRAVENOUS CODE/TRAUMA/SEDATION MEDICATION
Status: COMPLETED | OUTPATIENT
Start: 2021-01-29 | End: 2021-01-29

## 2021-01-29 RX ADMIN — Medication 4 ML: at 12:57

## 2021-01-29 RX ADMIN — CEFAZOLIN SODIUM 1000 MG: 1 INJECTION, POWDER, FOR SOLUTION INTRAMUSCULAR; INTRAVENOUS at 13:25

## 2021-01-29 RX ADMIN — FENTANYL CITRATE 50 MCG: 50 INJECTION, SOLUTION INTRAMUSCULAR; INTRAVENOUS at 13:27

## 2021-01-29 NOTE — QUICK NOTE
Patient monitored for 2-3 hours after procedure    Some abdominal tenderness  She describes her discomfort as similar to what she has been experiencing for the last 2 days since drain placement  Able to tolerate drinking fluids  Able to ambulate    There is now some fluid in the renal cyst and in the peritoneal cavity  Management will consist of 5 day course of antibiotics which I called into the pharmacy, for her start tonight  Pain control with medications available at home such as ibuprofen  I instructed her to call myself or the on call IR doctor for any increasing abdominal pain, chills or fever      We will continue to follow and discussed how to address the cyst if it recurs

## 2021-01-29 NOTE — SEDATION DOCUMENTATION
Discharge instructions reviewed with patient  Patient states understanding  IV removed   Patient discharged from IR in stable condition

## 2021-01-29 NOTE — DISCHARGE INSTRUCTIONS
Drainage Tube Removal    Your drainage tube was removed today  What you need know at home:   Keep a clean dry dressing at the tube site until the small opening closes  It will take twenty four to forty eight hours  Keep the site dry until it heals  A small amount of drainage on your dressing is normal  Resume your normal diet  Small sips of flat soda will help with any nausea  Contact Interventional Radiology for any of the following: You have pain, fever greater than 101, shaking chills  If you have increased redness or swelling at the site  I the drainage from your site does not stop  If the site drains pus or has a bad odor       Contact Interventional Radiology at [phone]   [place] PATIENTS: Contact Interventional Radiology at [phone]) Karan Cotton PATIENTS: Contact Interventional Radiology at [phone]) if:

## 2021-01-30 ENCOUNTER — TELEPHONE (OUTPATIENT)
Dept: INTERVENTIONAL RADIOLOGY/VASCULAR | Facility: CLINIC | Age: 61
End: 2021-01-30

## 2021-01-30 NOTE — QUICK NOTE
I received a telephone call from Ms Long Adames this afternoon approximately 4:00 p m  She is experiencing some mild abdominal pain, anorexia, concern for slight queasiness"  On further questioning she did not take her ciprofloxacin this morning but did take her Flagyl (based on size the pill)  She rates her abdominal pain at 3/10 and states that it is somewhat better than yesterday  She does report a low-grade fever of 99  X She is able to tolerate her seltzer but has no appetite  Given her Crohn's disease she has a somewhat restricted diet and avoids sugar  She did take an NSAID she had at home and said this improved things  Effectively, she has a mechanical peritonitis based on catheter manipulation  It is reassuring that her abdominal pain has not worsened and that it has responded to NSAIDs  We discussed options including going to the ED for observation and management  If she comes to the emergency room I would recommend intravenous antibiotics and intravenous fluids  We would probably also perform a CT scan although I would certainly expect to find some degree of pneumoperitoneum, contrast in the peritoneum, and contrast/fluid within the renal cyst   I would not recommend any aggressive interventional or surgical management in the absence of a true abdominal emergency which she does not have  We also discussed observation at home for another day and checking in tomorrow  This is only acceptable if she can take and keep down oral hydration and is able to take her antibiotics  Ciprofloxacin is absorbed in the upper GI tract and should have good absorption even in the absence of eating  I instructed her to take both antibiotics as prescribed, hydrate, continue with NSAIDS such as ibuprofen  She will go to the emergency room and/or call me back for any worsening abdominal pain, fever over 100, worsening nausea or vomiting such that she cannot take her medicine

## 2021-01-31 ENCOUNTER — TELEPHONE (OUTPATIENT)
Dept: INTERVENTIONAL RADIOLOGY/VASCULAR | Facility: CLINIC | Age: 61
End: 2021-01-31

## 2021-01-31 NOTE — QUICK NOTE
Called Ms Chinchilla to [facility] at approximately 11:15 a m  She feels better today  She is able to take the antibiotics, fluids, and some solid food  Her abdominal soreness has decreased  She has taken several doses of ibuprofen  No further subjective fevers  She describes the GI upset as probably related to the antibiotics and her lack of a colon rather than our manipulation 2 days ago  With regards to the flank pain associated with a cyst, she states that this is now gone and she is able to lie down on her side at night  I hope this response is durable  We discussed the next step including the fact that this cyst may again recur or other cysts may grow  We will discuss follow-up imaging with the urology service  In the absence of recurrent symptoms I would not need any specific follow-up imaging, however    I instructed her to finish the antibiotic course and take ibuprofen at her usual low (200-400 mg) doses as required for several days  Given her high surgical risks if the cysts recur we can consider a posterior single session sclerosis approach but I would certainly not explore that at this time      She will call us back for any worsening abdominal pain, fevers, etc   IR will continue to follow

## 2021-02-02 ENCOUNTER — TELEPHONE (OUTPATIENT)
Dept: INTERVENTIONAL RADIOLOGY/VASCULAR | Facility: CLINIC | Age: 61
End: 2021-02-02

## 2021-02-03 NOTE — QUICK NOTE
Checked in w/ Ms  Amor    She continues to improve clinically  No further abdominal pain at rest   She is tolerating a diet and has some small twinge of discomfort after eating, on her left side  This may relate to new position of the bowels  No fevers  No nausea  She has 1 more day of ciprofloxacin left  She in fact is well enough to do some amount of shoveling during the current snowstorm  Renal flank discomfort associated with the cyst is now gone  We will continue to follow with conservative management  She will call with any concerns, particularly I told her to call us if abdominal pain increases  I will discuss future options with the urology service

## 2021-04-19 DIAGNOSIS — Z23 ENCOUNTER FOR IMMUNIZATION: ICD-10-CM

## 2021-05-06 NOTE — PATIENT INSTRUCTIONS
Kidney Cyst   WHAT YOU NEED TO KNOW:   What is a kidney cyst?  A kidney cyst is a fluid-filled sac that grows in your kidney  A simple cyst usually does not contain cancer  A complex cyst contains calcium deposits and needs to be checked over time for cancer  You may have one or more cysts  Both kidneys may form cysts at the same time  What increases my risk for a kidney cyst?   · Age older than 48 years    · Male gender    · A disease that affects how your kidneys work    · High blood pressure    · Smoking cigarettes    · Family history of a kidney cyst    What are the signs and symptoms of a kidney cyst?  Kidney cysts usually do not cause symptoms  A cyst that grows large may cause pain or discomfort in your side or abdomen  You may have blood in your urine or dark urine, or develop high blood pressure  Tenderness along with pain and a fever may be a sign of an infected cyst   How are kidney cysts diagnosed? Kidney cysts are usually found during tests for another reason  Once the cyst is found, you may need the following:  · An ultrasound, CT, or MRI  may be used to take pictures of your kidneys and any cysts  You may be given contrast liquid to help your kidneys show up better in the pictures  Tell the healthcare provider if you have ever had an allergic reaction to contrast liquid  Do not enter the MRI room with any metal  Metal may cause serious injury  Tell the healthcare provider if you have any metal in or on your body  · Blood tests  may be used to check your kidney function  How are kidney cysts treated? Your cyst may need no treatment  Your healthcare provider may want you to have tests to check the size of the cyst over time  You may also need tests if you have hard deposits in the cyst  The deposits will be checked for cancer or other material that can cause health problems  Fluid may need to be drained from a cyst that becomes infected, bursts, or blocks blood or urine flow in the kidney  Surgery may be needed if the cyst is large  What can I do to prevent or manage kidney cysts? · Drink liquids as directed  Liquids help your kidneys work correctly  They can also help prevent a urinary tract infection  Ask your healthcare provider how much liquid to have each day and which liquids are best for you  Ask if you need to limit or not drink alcohol  Alcohol may damage your kidneys  · Manage health conditions  Over time, conditions such as diabetes and high blood pressure that are not controlled may damage your kidneys  · Do not smoke  Smoking may narrow blood vessels in your kidneys and raise your blood pressure  Smoking can also damage your kidneys  Ask your healthcare provider for information if you currently smoke and need help quitting  E-cigarettes or smokeless tobacco still contain nicotine  Talk to your healthcare provider before you use these products  When should I seek immediate care? · You have blood in your urine or your urine is dark  · You have trouble urinating, or you are urinating more often than usual     · You have a fever along with pain or tenderness below your ribcage and above your hip bones  When should I contact my healthcare provider? · You have questions or concerns about your condition or care  CARE AGREEMENT:   You have the right to help plan your care  Learn about your health condition and how it may be treated  Discuss treatment options with your healthcare providers to decide what care you want to receive  You always have the right to refuse treatment  The above information is an  only  It is not intended as medical advice for individual conditions or treatments  Talk to your doctor, nurse or pharmacist before following any medical regimen to see if it is safe and effective for you    © Copyright [address] Information is for End User's use only and may not be sold, redistributed or otherwise used for commercial purposes   All illustrations and images included in CareNotes® are the copyrighted property of A D [employer] M [employer]  or [employer]

## 2021-05-06 NOTE — PROGRESS NOTES
Problem List Items Addressed This Visit        Genitourinary    Renal cyst - Primary    Relevant Orders    US kidney and bladder with pvr    Basic metabolic panel       Other    Flank pain            Discussion:     Zo Bledsoe is doing well  She does have some occasional flank pain which is aggravated by physical activity, she has no positive abdominal examination findings, ileostomy is working well without hernia  She does get better when she has her cyst drained, unfortunately could not be sclerosed at her last attempt at this  She will see me back in 6 months with an ultrasound and kidney function testing prior  I would like to avoid entering into her abdomen at all costs if possible given that she has an ileostomy and a storied  Abdominal surgery history      Assessment and plan:     Please see problem oriented charting for the assessment plan of today's urological complaints    Taylor Hernandez MD      Chief Complaint     No chief complaint on file  History of Present Illness     Ying Woodruff is a 63 y o  woman with flank pain, left, and renal cyst s/p multiple drainage procedures  No fevers or chills, occasional flank pain, aggravated by physical activity  No fevers or chills    The following portions of the patient's history were reviewed and updated as appropriate: allergies, current medications, past family history, past medical history, past social history, past surgical history and problem list       Detailed Urologic History     - please refer to HPI    Review of Systems     Review of Systems   Constitutional: Negative  HENT: Negative  Eyes: Negative  Respiratory: Negative  Cardiovascular: Negative  Gastrointestinal: Negative  Endocrine: Negative  Genitourinary: Positive for flank pain  Skin: Negative  Allergic/Immunologic: Negative  Neurological: Negative  Hematological: Negative  Psychiatric/Behavioral: Negative                Allergies     No Known Allergies    Physical Exam     Physical Exam  Vitals signs reviewed  Constitutional:       General: She is not in acute distress  Appearance: Normal appearance  She is not ill-appearing, toxic-appearing or diaphoretic  HENT:      Head: Normocephalic and atraumatic  Eyes:      General: No scleral icterus  Right eye: No discharge  Left eye: No discharge  Cardiovascular:      Pulses: Normal pulses  Pulmonary:      Effort: Pulmonary effort is normal    Abdominal:      General: There is no distension  Palpations: There is no mass  Tenderness: There is no abdominal tenderness  There is no guarding or rebound  Hernia: No hernia is present  Comments: Ileostomy patent, draining well   Musculoskeletal: Normal range of motion  General: No swelling  Skin:     General: Skin is warm  Neurological:      General: No focal deficit present  Mental Status: She is alert and oriented to person, place, and time  Cranial Nerves: No cranial nerve deficit  Sensory: No sensory deficit  Motor: No weakness  Coordination: Coordination normal    Psychiatric:         Mood and Affect: Mood normal          Behavior: Behavior normal          Thought Content: Thought content normal          Judgment: Judgment normal              Vital Signs  There were no vitals filed for this visit  Current Medications       Current Outpatient Medications:     amoxicillin (AMOXIL) 500 mg capsule, TAKE 4 CAPSULES 1 HOUR PRIOR TO DENTAL APPOINTMENT , Disp: , Rfl: 0    Cholecalciferol (VITAMIN D3) 07662 units TABS, Take by mouth, Disp: , Rfl:     cyanocobalamin (VITAMIN B-12) 500 mcg tablet, Take 1,000 mcg by mouth, Disp: , Rfl:     ferrous sulfate 325 (65 Fe) mg tablet, Take by mouth, Disp: , Rfl:     levothyroxine 25 mcg tablet, Take 25 mcg by mouth daily, Disp: , Rfl: 1    metoprolol succinate (TOPROL-XL) 25 mg 24 hr tablet, Take 1/2 Tab   Daily, Disp: , Rfl: 0   Multiple Vitamins-Minerals (MULTIVITAMIN WITH MINERALS) tablet, Take 1 tablet by mouth daily, Disp: , Rfl:       Active Problems     Patient Active Problem List   Diagnosis    Abnormal finding on radiology exam    Flank pain    Other hydronephrosis    Renal cyst         Past Medical History     Past Medical History:   Diagnosis Date    Crohn's disease ([address] )     Disease of thyroid gland     Gallstones     History of transfusion     Hypertension     Kidney stones     Osteoporosis     PONV (postoperative nausea and vomiting)          Surgical History     Past Surgical History:   Procedure Laterality Date    COLECTOMY      CT GUIDED PERC DRAINAGE CATHETER PLACEMENT  1/26/2021    ILEOSTOMY      IR DRAINAGE TUBE CHECK WITH SCLEROSIS  1/29/2021    IR IMAGE GUIDED ASPIRATION / DRAINAGE W TUBE  6/27/2019    LITHOTRIPSY      [place] CYSTOURETHROSCOPY,URETER CATHETER Bilateral 5/24/2018    Procedure: CYSTOSCOPY WITH RETROGRADE PYELOGRAM;  Surgeon: Shaun Ascencio MD;  [facility];  Service: Urology    TOTAL HIP ARTHROPLASTY Left          Family History     Family History   Problem Relation Age of Onset    Diabetes Mother     Heart disease Father     Stroke Brother     Stomach cancer Maternal Grandmother          Social History     Social History     Social History     Tobacco Use   Smoking Status Former Smoker   Smokeless Tobacco Never Used   Tobacco Comment    quit  20 years ago          Pertinent Lab Values     Lab Results   Component Value Date    CREATININE 0 69 02/11/2014                 Pertinent Imaging       No new imaging for my review

## 2021-05-07 ENCOUNTER — OFFICE VISIT (OUTPATIENT)
Dept: UROLOGY | Facility: CLINIC | Age: 61
End: 2021-05-07
Payer: COMMERCIAL

## 2021-05-07 VITALS
SYSTOLIC BLOOD PRESSURE: 120 MMHG | WEIGHT: 125 LBS | DIASTOLIC BLOOD PRESSURE: 80 MMHG | BODY MASS INDEX: 23 KG/M2 | HEIGHT: 62 IN | HEART RATE: 80 BPM

## 2021-05-07 DIAGNOSIS — N28.1 RENAL CYST: Primary | ICD-10-CM

## 2021-05-07 DIAGNOSIS — R10.9 FLANK PAIN: ICD-10-CM

## 2021-05-07 PROCEDURE — 99213 OFFICE O/P EST LOW 20 MIN: CPT | Performed by: UROLOGY

## 2021-05-07 NOTE — LETTER
May 7, 2021     Alex Shuck, [id number] [facility]    Patient: Juan Antonio Barth   YOB: 1960   Date of Visit: 5/7/2021       Dear Dr Iqra Barajas:    Thank you for referring Juan Antonio Barth to me for evaluation  Below are my notes for this consultation  If you have questions, please do not hesitate to call me  I look forward to following your patient along with you  Sincerely,        Love Cerda MD        CC: No Recipients  Love Cerda MD  5/7/2021  2:47 PM  Sign when Signing Visit       Problem List Items Addressed This Visit        Genitourinary    Renal cyst - Primary    Relevant Orders    US kidney and bladder with pvr    Basic metabolic panel       Other    Flank pain            Discussion:     Home Mccrary is doing well  She does have some occasional flank pain which is aggravated by physical activity, she has no positive abdominal examination findings, ileostomy is working well without hernia  She does get better when she has her cyst drained, unfortunately could not be sclerosed at her last attempt at this  She will see me back in 6 months with an ultrasound and kidney function testing prior  I would like to avoid entering into her abdomen at all costs if possible given that she has an ileostomy and a storied  Abdominal surgery history      Assessment and plan:     Please see problem oriented charting for the assessment plan of today's urological complaints    Love Cerda MD      Chief Complaint     No chief complaint on file  History of Present Illness     Juan Antonio Barth is a 63 y o  woman with flank pain, left, and renal cyst s/p multiple drainage procedures  No fevers or chills, occasional flank pain, aggravated by physical activity   No fevers or chills    The following portions of the patient's history were reviewed and updated as appropriate: allergies, current medications, past family history, past medical history, past social history, past surgical history and problem list       Detailed Urologic History     - please refer to HPI    Review of Systems     Review of Systems   Constitutional: Negative  HENT: Negative  Eyes: Negative  Respiratory: Negative  Cardiovascular: Negative  Gastrointestinal: Negative  Endocrine: Negative  Genitourinary: Positive for flank pain  Skin: Negative  Allergic/Immunologic: Negative  Neurological: Negative  Hematological: Negative  Psychiatric/Behavioral: Negative  Allergies     No Known Allergies    Physical Exam     Physical Exam  Vitals signs reviewed  Constitutional:       General: She is not in acute distress  Appearance: Normal appearance  She is not ill-appearing, toxic-appearing or diaphoretic  HENT:      Head: Normocephalic and atraumatic  Eyes:      General: No scleral icterus  Right eye: No discharge  Left eye: No discharge  Cardiovascular:      Pulses: Normal pulses  Pulmonary:      Effort: Pulmonary effort is normal    Abdominal:      General: There is no distension  Palpations: There is no mass  Tenderness: There is no abdominal tenderness  There is no guarding or rebound  Hernia: No hernia is present  Comments: Ileostomy patent, draining well   Musculoskeletal: Normal range of motion  General: No swelling  Skin:     General: Skin is warm  Neurological:      General: No focal deficit present  Mental Status: She is alert and oriented to person, place, and time  Cranial Nerves: No cranial nerve deficit  Sensory: No sensory deficit  Motor: No weakness  Coordination: Coordination normal    Psychiatric:         Mood and Affect: Mood normal          Behavior: Behavior normal          Thought Content: Thought content normal          Judgment: Judgment normal              Vital Signs  There were no vitals filed for this visit        Current Medications Current Outpatient Medications:     amoxicillin (AMOXIL) 500 mg capsule, TAKE 4 CAPSULES 1 HOUR PRIOR TO DENTAL APPOINTMENT , Disp: , Rfl: 0    Cholecalciferol (VITAMIN D3) 26218 units TABS, Take by mouth, Disp: , Rfl:     cyanocobalamin (VITAMIN B-12) 500 mcg tablet, Take 1,000 mcg by mouth, Disp: , Rfl:     ferrous sulfate 325 (65 Fe) mg tablet, Take by mouth, Disp: , Rfl:     levothyroxine 25 mcg tablet, Take 25 mcg by mouth daily, Disp: , Rfl: 1    metoprolol succinate (TOPROL-XL) 25 mg 24 hr tablet, Take 1/2 Tab   Daily, Disp: , Rfl: 0    Multiple Vitamins-Minerals (MULTIVITAMIN WITH MINERALS) tablet, Take 1 tablet by mouth daily, Disp: , Rfl:       Active Problems     Patient Active Problem List   Diagnosis    Abnormal finding on radiology exam    Flank pain    Other hydronephrosis    Renal cyst         Past Medical History     Past Medical History:   Diagnosis Date    [address]'s [address] ([facility] [address]     Disease of thyroid gland     Gallstones     History of transfusion     Hypertension     Kidney stones     Osteoporosis     PONV (postoperative nausea and vomiting)          Surgical History     Past Surgical History:   Procedure Laterality Date    COLECTOMY      CT GUIDED PERC DRAINAGE CATHETER PLACEMENT  1/26/2021    ILEOSTOMY      IR DRAINAGE TUBE CHECK WITH SCLEROSIS  1/29/2021    IR IMAGE GUIDED ASPIRATION / DRAINAGE W TUBE  6/27/2019    LITHOTRIPSY      [place] CYSTOURETHROSCOPY,URETER CATHETER Bilateral 5/24/2018    Procedure: CYSTOSCOPY WITH RETROGRADE PYELOGRAM;  Surgeon: Trinidad Rausch MD;  [facility];  Service: Urology    TOTAL HIP ARTHROPLASTY Left          Family History     Family History   Problem Relation Age of Onset    Diabetes Mother     Heart disease Father     Stroke Brother     Stomach cancer Maternal Grandmother          Social History     Social History     Social History     Tobacco Use   Smoking Status Former Smoker   Smokeless Tobacco Never Used Tobacco Comment    quit  20 years ago          Pertinent Lab Values     Lab Results   Component Value Date    CREATININE 0 69 02/11/2014                 Pertinent Imaging       No new imaging for my review

## 2021-09-29 ENCOUNTER — TELEPHONE (OUTPATIENT)
Dept: OTHER | Facility: OTHER | Age: 61
End: 2021-09-29

## 2021-09-29 DIAGNOSIS — R39.9 UTI SYMPTOMS: Primary | ICD-10-CM

## 2021-09-29 NOTE — TELEPHONE ENCOUNTER
The patient believes she has a UTI  She would like to know if she can have urine culture scripts faxed to the [facility] lab near her the fax number is  [phone]

## 2021-09-29 NOTE — TELEPHONE ENCOUNTER
Orders faxed to [facility] per patient request      Called and advised patient orders were faxed  Reviewed we will then follow up with results  She verbalized understanding

## 2021-10-01 NOTE — TELEPHONE ENCOUNTER
Left VM telling patient culture and urine was negative and to just increase hydration  Provided office number for any further questions

## 2021-11-04 ENCOUNTER — HOSPITAL ENCOUNTER (OUTPATIENT)
Dept: RADIOLOGY | Age: 61
Discharge: HOME | End: 2021-11-04
Payer: COMMERCIAL

## 2021-11-04 DIAGNOSIS — N28.1 RENAL CYST: ICD-10-CM

## 2021-11-04 PROCEDURE — 76770 US EXAM ABDO BACK WALL COMP: CPT

## 2021-11-16 ENCOUNTER — OFFICE VISIT (OUTPATIENT)
Dept: UROLOGY | Facility: CLINIC | Age: 61
End: 2021-11-16
Payer: COMMERCIAL

## 2021-11-16 VITALS — BODY MASS INDEX: 23.92 KG/M2 | HEIGHT: 62 IN | WEIGHT: 130 LBS | HEART RATE: 72 BPM

## 2021-11-16 DIAGNOSIS — N28.1 KIDNEY CYST, ACQUIRED: Primary | ICD-10-CM

## 2021-11-16 PROCEDURE — 99213 OFFICE O/P EST LOW 20 MIN: CPT | Performed by: PHYSICIAN ASSISTANT

## 2022-09-19 ENCOUNTER — HOSPITAL ENCOUNTER (OUTPATIENT)
Dept: RADIOLOGY | Age: 62
Discharge: HOME | End: 2022-09-19
Payer: COMMERCIAL

## 2022-09-19 DIAGNOSIS — N28.1 KIDNEY CYST, ACQUIRED: ICD-10-CM

## 2022-09-19 PROCEDURE — 76770 US EXAM ABDO BACK WALL COMP: CPT

## 2022-10-26 ENCOUNTER — OFFICE VISIT (OUTPATIENT)
Dept: UROLOGY | Facility: CLINIC | Age: 62
End: 2022-10-26
Payer: COMMERCIAL

## 2022-10-26 VITALS
BODY MASS INDEX: 24.66 KG/M2 | HEIGHT: 62 IN | SYSTOLIC BLOOD PRESSURE: 130 MMHG | OXYGEN SATURATION: 98 % | DIASTOLIC BLOOD PRESSURE: 80 MMHG | HEART RATE: 73 BPM | WEIGHT: 134 LBS

## 2022-10-26 DIAGNOSIS — N28.1 KIDNEY CYST, ACQUIRED: Primary | ICD-10-CM

## 2022-10-26 LAB
SL AMB  POCT GLUCOSE, UA: NORMAL
SL AMB LEUKOCYTE ESTERASE,UA: NORMAL
SL AMB POCT BILIRUBIN,UA: NORMAL
SL AMB POCT BLOOD,UA: NORMAL
SL AMB POCT CLARITY,UA: CLEAR
SL AMB POCT COLOR,UA: YELLOW
SL AMB POCT KETONES,UA: NORMAL
SL AMB POCT NITRITE,UA: NORMAL
SL AMB POCT PH,UA: 6
SL AMB POCT SPECIFIC GRAVITY,UA: 1030
SL AMB POCT URINE PROTEIN: 30
SL AMB POCT UROBILINOGEN: 0.2

## 2022-10-26 PROCEDURE — 81002 URINALYSIS NONAUTO W/O SCOPE: CPT | Performed by: PHYSICIAN ASSISTANT

## 2022-10-26 PROCEDURE — 99213 OFFICE O/P EST LOW 20 MIN: CPT | Performed by: PHYSICIAN ASSISTANT

## 2022-10-26 RX ORDER — METOPROLOL SUCCINATE 50 MG/1
50 TABLET, EXTENDED RELEASE ORAL DAILY
COMMUNITY
Start: 2022-09-15

## 2022-10-26 NOTE — PROGRESS NOTES
UROLOGY PROGRESS NOTE   Patient Identifiers: Tora Saint (MRN [id number])  Date of Service: 10/26/2022    Subjective:    77-year-old female well known to me with a history of kidney stones  She has ileostomy due to Crohn's disease  She also had a left renal cyst which because it was symptomatic required aspiration by Interventional Radiology  She is here for follow-up  Sound shows multiple bilateral renal cysts some mildly complex  Short-term follow-up was recommended      Reason for visit:  Kidney cyst follow-up      Objective:     VITALS:    Vitals:    10/26/22 1320   BP: 130/80   Pulse: 73   SpO2: 98%           LABS:  Lab Results   Component Value Date    HGB 13 4 01/26/2021    HCT 42 2 01/26/2021    WBC 7 98 01/26/2021     01/26/2021   ]    Lab Results   Component Value Date     02/11/2014    K 3 1 (L) 02/11/2014     02/11/2014    CO2 29 02/11/2014    BUN 10 02/11/2014    CREATININE 0 69 02/11/2014    CALCIUM 7 4 (L) 02/11/2014    GLUCOSE 99 02/11/2014   ]        INPATIENT MEDS:    Current Outpatient Medications:   •  amoxicillin (AMOXIL) 500 mg capsule, TAKE 4 CAPSULES 1 HOUR PRIOR TO DENTAL APPOINTMENT , Disp: , Rfl: 0  •  Cholecalciferol (VITAMIN D3) 13062 units TABS, Take by mouth, Disp: , Rfl:   •  ferrous sulfate 325 (65 Fe) mg tablet, Take by mouth, Disp: , Rfl:   •  levothyroxine 25 mcg tablet, Take 25 mcg by mouth daily, Disp: , Rfl: 1  •  metoprolol succinate (TOPROL-XL) 50 mg 24 hr tablet, Take 50 mg by mouth daily, Disp: , Rfl:   •  Multiple Vitamins-Minerals (MULTIVITAMIN WITH MINERALS) tablet, Take 1 tablet by mouth daily, Disp: , Rfl:   •  cyanocobalamin (VITAMIN B-12) 500 mcg tablet, Take 1,000 mcg by mouth, Disp: , Rfl:       Physical Exam:   /80 (BP Location: Left arm, Patient Position: Sitting, Cuff Size: Standard)   Pulse 73   Ht 5' 2" (1 575 m)   Wt 60 8 kg (134 lb)   SpO2 98%   BMI 24 51 kg/m²   GEN: no acute distress    RESP: labored breathing and breathing comfortably with no accessory muscle use    ABD: soft, non-tender, non-distended   INCISION:    EXT: no significant peripheral edema     RADIOLOGY:   RENAL ULTRASOUND   IMPRESSION:     1   Multiple complex cysts in each kidney  Possible new complex cyst in the lower pole the left kidney measuring 3 x 2 7 x 3 3 cm  Others with indeterminate comparison to prior imaging  Recommend continued follow-up surveillance imaging or further   characterization with contrast-enhanced CT, renal mass protocol  2   No hydronephrosis       Assessment:   1  Bilateral renal cysts  2   Nephrolithiasis     Plan:   -follow-up in 6 months with CT renal mass protocol prior to visit  -  -  -

## 2023-10-24 ENCOUNTER — HOSPITAL ENCOUNTER (OUTPATIENT)
Dept: RADIOLOGY | Facility: IMAGING CENTER | Age: 63
Discharge: HOME | End: 2023-10-24
Payer: COMMERCIAL

## 2023-10-24 VITALS — BODY MASS INDEX: 24.67 KG/M2 | HEIGHT: 62 IN | WEIGHT: 134.04 LBS

## 2023-10-24 DIAGNOSIS — Z12.31 ENCOUNTER FOR SCREENING MAMMOGRAM FOR MALIGNANT NEOPLASM OF BREAST: ICD-10-CM

## 2023-10-24 PROCEDURE — 77067 SCR MAMMO BI INCL CAD: CPT

## 2023-10-24 PROCEDURE — 77063 BREAST TOMOSYNTHESIS BI: CPT

## 2024-01-11 NOTE — BRIEF OP NOTE (RAD/CATH)
INTERVENTIONAL RADIOLOGY PROCEDURE NOTE    Date: 1/29/2021    Procedure: IR DRAINAGE TUBE CHECK WITH SCLEROSIS    Preoperative diagnosis:   1  Renal cyst         Postoperative diagnosis: Same  Surgeon: Martir Chilel MD     Assistant: None  No qualified resident was available  Antibiotics:  Ancef    Blood loss: 0    Specimens: none     Findings:     Recently placed drain within a portion of the cyst that has now closed  Remainder of the cyst with multiple septations  Patient prepped and draped in sterile fashion, attempts to reposition drain into other loculations within the kidney without success    Eventually access to the cyst lost and catheter in the peritoneum  At this point we concluded the procedure  No alcohol injected    Complications: as above    Anesthesia: local and IV Fentanyl     Plan:  Monitor for abdominal pain  Initiate flagyl/cipro for 5 days  We will need to reimage the cyst if symptoms recur    Patient will call us for any abdominal pain, fevers, chills done Family

## 2024-11-18 ENCOUNTER — TELEPHONE (OUTPATIENT)
Age: 64
End: 2024-11-18

## 2024-11-18 DIAGNOSIS — N28.1 RENAL CYST: Primary | ICD-10-CM

## 2024-11-18 NOTE — TELEPHONE ENCOUNTER
Patient called to schedule annual appointment with [facility]. Patient will need updated CT script for upcoming appointment due to  date and change of insurance.     medicare a&B plan G : [id number]  supplement insurance: [employer] [id number]    Call back: [phone]

## 2025-05-08 ENCOUNTER — HOSPITAL ENCOUNTER (OUTPATIENT)
Dept: RADIOLOGY | Facility: IMAGING CENTER | Age: 65
End: 2025-05-08
Attending: FAMILY MEDICINE
Payer: MEDICARE

## 2025-05-08 VITALS — WEIGHT: 134 LBS | BODY MASS INDEX: 24.66 KG/M2 | HEIGHT: 62 IN

## 2025-05-08 DIAGNOSIS — Z12.31 ENCOUNTER FOR SCREENING MAMMOGRAM FOR MALIGNANT NEOPLASM OF BREAST: ICD-10-CM

## 2025-05-08 PROCEDURE — 77067 SCR MAMMO BI INCL CAD: CPT

## 2025-05-08 PROCEDURE — 77063 BREAST TOMOSYNTHESIS BI: CPT

## 2025-08-15 ENCOUNTER — HOSPITAL ENCOUNTER (OUTPATIENT)
Dept: RADIOLOGY | Age: 65
Discharge: HOME | End: 2025-08-15
Attending: PHYSICIAN ASSISTANT
Payer: MEDICARE

## 2025-08-15 ENCOUNTER — APPOINTMENT (OUTPATIENT)
Dept: LAB | Age: 65
End: 2025-08-15
Payer: MEDICARE

## (undated) DEVICE — GUIDEWIRE STRGHT TIP 0.035 IN  SOLO PLUS

## (undated) DEVICE — CATH URETERAL 5FR X 70 CM FLEX TIP POLYUR BARD

## (undated) DEVICE — GLOVE INDICATOR PI UNDERGLOVE SZ 8 BLUE

## (undated) DEVICE — SPECIMEN CONTAINER STERILE PEEL PACK

## (undated) DEVICE — GLOVE SRG BIOGEL ECLIPSE 8

## (undated) DEVICE — STERILE CYSTO PACK: Brand: CARDINAL HEALTH